# Patient Record
Sex: MALE | Race: BLACK OR AFRICAN AMERICAN | Employment: FULL TIME | URBAN - METROPOLITAN AREA
[De-identification: names, ages, dates, MRNs, and addresses within clinical notes are randomized per-mention and may not be internally consistent; named-entity substitution may affect disease eponyms.]

---

## 2023-07-08 ENCOUNTER — HOSPITAL ENCOUNTER (EMERGENCY)
Age: 43
Discharge: HOME | End: 2023-07-08
Attending: STUDENT IN AN ORGANIZED HEALTH CARE EDUCATION/TRAINING PROGRAM

## 2023-07-08 VITALS
OXYGEN SATURATION: 97 % | HEART RATE: 70 BPM | BODY MASS INDEX: 41.3 KG/M2 | SYSTOLIC BLOOD PRESSURE: 141 MMHG | TEMPERATURE: 97.4 F | HEIGHT: 71 IN | WEIGHT: 295 LBS | RESPIRATION RATE: 16 BRPM | DIASTOLIC BLOOD PRESSURE: 81 MMHG

## 2023-07-08 DIAGNOSIS — J39.2 THROAT IRRITATION: ICD-10-CM

## 2023-07-08 DIAGNOSIS — Z20.2 TRICHOMONAS EXPOSURE: Primary | ICD-10-CM

## 2023-07-08 PROCEDURE — 99283 EMERGENCY DEPT VISIT LOW MDM: CPT

## 2023-07-08 PROCEDURE — 6370000000 HC RX 637 (ALT 250 FOR IP): Performed by: STUDENT IN AN ORGANIZED HEALTH CARE EDUCATION/TRAINING PROGRAM

## 2023-07-08 RX ORDER — KETOTIFEN FUMARATE 0.35 MG/ML
1 SOLUTION/ DROPS OPHTHALMIC 2 TIMES DAILY
Qty: 1 ML | Refills: 0 | Status: SHIPPED | OUTPATIENT
Start: 2023-07-08 | End: 2023-07-18

## 2023-07-08 RX ORDER — METRONIDAZOLE 500 MG/1
2000 TABLET ORAL ONCE
Status: COMPLETED | OUTPATIENT
Start: 2023-07-08 | End: 2023-07-08

## 2023-07-08 RX ORDER — LIDOCAINE HYDROCHLORIDE 20 MG/ML
10 SOLUTION OROPHARYNGEAL 3 TIMES DAILY PRN
Qty: 100 ML | Refills: 0 | Status: SHIPPED | OUTPATIENT
Start: 2023-07-08

## 2023-07-08 RX ORDER — CETIRIZINE HYDROCHLORIDE 10 MG/1
10 TABLET ORAL DAILY
Qty: 30 TABLET | Refills: 0 | Status: SHIPPED | OUTPATIENT
Start: 2023-07-08 | End: 2023-08-07

## 2023-07-08 RX ADMIN — METRONIDAZOLE 2000 MG: 500 TABLET ORAL at 11:27

## 2023-07-08 RX ADMIN — Medication: at 11:57

## 2023-07-08 ASSESSMENT — ENCOUNTER SYMPTOMS
VOMITING: 0
SINUS PRESSURE: 0
EYE DISCHARGE: 1
BACK PAIN: 0
DIARRHEA: 0
EYE REDNESS: 0
NAUSEA: 0
WHEEZING: 0
COUGH: 0
SORE THROAT: 0
ABDOMINAL PAIN: 0
EYE PAIN: 0
SHORTNESS OF BREATH: 0

## 2023-07-08 NOTE — ED PROVIDER NOTES
HPI   Patient is a  here from [place] for work. He states that his primary doctor called him and told him he was positive for trichomonas so that he can get treated. He states that last week he was treated with Rocephin and Doxy for STD exposure he is still currently taking the doxycycline. He states he had performed oral sex on another person and feels like he is having some throat irritation. He denies any chest pain or shortness of breath he is able to eat and drink fluids. He is also having bilateral watery eye drainage. Ongoing for the last 3 weeks. He states he had a stray cat and he then touched his eyes after touching the cat believes he may have gotten infected. He saw his primary doctor who gave him gentamicin eyedrops. He states that his eyes are still draining clear fluid. Denies any vision difficulty or pain. No facial swelling. Review of Systems   Constitutional:  Negative for chills and fever. Watery eye discharge. HENT:  Negative for ear pain, sinus pressure and sore throat. Throat irritation. Eyes:  Positive for discharge. Negative for pain and redness. Respiratory:  Negative for cough, shortness of breath and wheezing. Cardiovascular:  Negative for chest pain. Gastrointestinal:  Negative for abdominal pain, diarrhea, nausea and vomiting. Genitourinary:  Negative for dysuria and frequency. Musculoskeletal:  Negative for arthralgias and back pain. Skin:  Negative for rash and wound. Neurological:  Negative for weakness and headaches. Hematological:  Negative for adenopathy. All other systems reviewed and are negative. Physical Exam  Vitals and nursing note reviewed. Constitutional:       Appearance: He is well-developed. HENT:      Head: Normocephalic and atraumatic. Mouth/Throat:      Comments: Oropharynx is clear, no thrush, no lesions.   Eyes:      Conjunctiva/sclera: Conjunctivae normal.      Pupils: Pupils are equal,